# Patient Record
Sex: MALE | URBAN - METROPOLITAN AREA
[De-identification: names, ages, dates, MRNs, and addresses within clinical notes are randomized per-mention and may not be internally consistent; named-entity substitution may affect disease eponyms.]

---
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
## 2024-04-17 ENCOUNTER — HOSPITAL ENCOUNTER (INPATIENT)
Age: 19
LOS: 7 days | Discharge: HOME | DRG: 885 | End: 2024-04-24
Attending: EMERGENCY MEDICINE | Admitting: PSYCHIATRY & NEUROLOGY
Payer: MEDICAID

## 2024-04-17 DIAGNOSIS — R45.851 DEPRESSION WITH SUICIDAL IDEATION: Primary | ICD-10-CM

## 2024-04-17 DIAGNOSIS — F32.A DEPRESSION WITH SUICIDAL IDEATION: Primary | ICD-10-CM

## 2024-04-17 LAB
ALBUMIN SERPL-MCNC: 4.9 G/DL (ref 3.5–5.2)
ALP SERPL-CCNC: 62 U/L (ref 40–129)
ALT SERPL-CCNC: 15 U/L (ref 5–41)
AMPHET UR QL SCN: NEGATIVE
ANION GAP SERPL CALCULATED.3IONS-SCNC: 12 MMOL/L (ref 9–17)
APAP SERPL-MCNC: <5 UG/ML (ref 10–30)
AST SERPL-CCNC: 22 U/L
BARBITURATES UR QL SCN: NEGATIVE
BENZODIAZ UR QL: NEGATIVE
BILIRUB SERPL-MCNC: 0.5 MG/DL (ref 0.3–1.2)
BUN SERPL-MCNC: 11 MG/DL (ref 6–20)
CALCIUM SERPL-MCNC: 9.6 MG/DL (ref 8.6–10.4)
CANNABINOIDS UR QL SCN: NEGATIVE
CHLORIDE SERPL-SCNC: 98 MMOL/L (ref 98–107)
CO2 SERPL-SCNC: 28 MMOL/L (ref 20–31)
COCAINE UR QL SCN: NEGATIVE
CREAT SERPL-MCNC: 0.9 MG/DL (ref 0.7–1.2)
ERYTHROCYTE [DISTWIDTH] IN BLOOD BY AUTOMATED COUNT: 12.3 % (ref 11.5–14.9)
ETHANOL PERCENT: <0.01 %
ETHANOLAMINE SERPL-MCNC: <10 MG/DL
FENTANYL UR QL: NEGATIVE
GFR SERPL CREATININE-BSD FRML MDRD: >90 ML/MIN/1.73M2
GLUCOSE SERPL-MCNC: 97 MG/DL (ref 70–99)
HCT VFR BLD AUTO: 45 % (ref 41–53)
HGB BLD-MCNC: 15 G/DL (ref 13.5–17.5)
MCH RBC QN AUTO: 30.8 PG (ref 26–34)
MCHC RBC AUTO-ENTMCNC: 33.4 G/DL (ref 31–37)
MCV RBC AUTO: 92.1 FL (ref 80–100)
METHADONE UR QL: NEGATIVE
OPIATES UR QL SCN: NEGATIVE
OXYCODONE UR QL SCN: NEGATIVE
PCP UR QL SCN: NEGATIVE
PLATELET # BLD AUTO: 247 K/UL (ref 150–450)
PMV BLD AUTO: 8.4 FL (ref 6–12)
POTASSIUM SERPL-SCNC: 4.2 MMOL/L (ref 3.7–5.3)
PROT SERPL-MCNC: 7.7 G/DL (ref 6.4–8.3)
RBC # BLD AUTO: 4.88 M/UL (ref 4.5–5.9)
SALICYLATES SERPL-MCNC: <1 MG/DL (ref 3–10)
SODIUM SERPL-SCNC: 138 MMOL/L (ref 135–144)
TEST INFORMATION: NORMAL
TRICYCLIC ANTIDEP,URINE: NEGATIVE
TSH SERPL DL<=0.05 MIU/L-ACNC: 1.06 UIU/ML (ref 0.3–5)
WBC OTHER # BLD: 6.8 K/UL (ref 4.5–13.5)

## 2024-04-17 PROCEDURE — 84443 ASSAY THYROID STIM HORMONE: CPT

## 2024-04-17 PROCEDURE — 36415 COLL VENOUS BLD VENIPUNCTURE: CPT

## 2024-04-17 PROCEDURE — 80053 COMPREHEN METABOLIC PANEL: CPT

## 2024-04-17 PROCEDURE — 85027 COMPLETE CBC AUTOMATED: CPT

## 2024-04-17 PROCEDURE — 99285 EMERGENCY DEPT VISIT HI MDM: CPT

## 2024-04-17 PROCEDURE — 80179 DRUG ASSAY SALICYLATE: CPT

## 2024-04-17 PROCEDURE — 80143 DRUG ASSAY ACETAMINOPHEN: CPT

## 2024-04-17 PROCEDURE — G0480 DRUG TEST DEF 1-7 CLASSES: HCPCS

## 2024-04-17 PROCEDURE — 80307 DRUG TEST PRSMV CHEM ANLYZR: CPT

## 2024-04-17 PROCEDURE — 1240000000 HC EMOTIONAL WELLNESS R&B

## 2024-04-17 RX ORDER — MAGNESIUM HYDROXIDE/ALUMINUM HYDROXICE/SIMETHICONE 120; 1200; 1200 MG/30ML; MG/30ML; MG/30ML
30 SUSPENSION ORAL EVERY 6 HOURS PRN
Status: DISCONTINUED | OUTPATIENT
Start: 2024-04-17 | End: 2024-04-24 | Stop reason: HOSPADM

## 2024-04-17 RX ORDER — TRAZODONE HYDROCHLORIDE 50 MG/1
50 TABLET ORAL NIGHTLY PRN
Status: DISCONTINUED | OUTPATIENT
Start: 2024-04-18 | End: 2024-04-17

## 2024-04-17 RX ORDER — TRAZODONE HYDROCHLORIDE 50 MG/1
50 TABLET ORAL NIGHTLY PRN
Status: DISCONTINUED | OUTPATIENT
Start: 2024-04-17 | End: 2024-04-24 | Stop reason: HOSPADM

## 2024-04-17 RX ORDER — ACETAMINOPHEN 325 MG/1
650 TABLET ORAL EVERY 4 HOURS PRN
Status: DISCONTINUED | OUTPATIENT
Start: 2024-04-17 | End: 2024-04-24 | Stop reason: HOSPADM

## 2024-04-17 RX ORDER — IBUPROFEN 400 MG/1
400 TABLET ORAL EVERY 6 HOURS PRN
Status: DISCONTINUED | OUTPATIENT
Start: 2024-04-17 | End: 2024-04-24 | Stop reason: HOSPADM

## 2024-04-17 RX ORDER — POLYETHYLENE GLYCOL 3350 17 G/17G
17 POWDER, FOR SOLUTION ORAL DAILY PRN
Status: DISCONTINUED | OUTPATIENT
Start: 2024-04-17 | End: 2024-04-24 | Stop reason: HOSPADM

## 2024-04-17 RX ORDER — HYDROXYZINE 50 MG/1
50 TABLET, FILM COATED ORAL 3 TIMES DAILY PRN
Status: DISCONTINUED | OUTPATIENT
Start: 2024-04-17 | End: 2024-04-24 | Stop reason: HOSPADM

## 2024-04-17 RX ORDER — POLYETHYLENE GLYCOL 3350 17 G
2 POWDER IN PACKET (EA) ORAL
Status: DISCONTINUED | OUTPATIENT
Start: 2024-04-17 | End: 2024-04-17

## 2024-04-17 ASSESSMENT — PATIENT HEALTH QUESTIONNAIRE - PHQ9
SUM OF ALL RESPONSES TO PHQ QUESTIONS 1-9: 17
7. TROUBLE CONCENTRATING ON THINGS, SUCH AS READING THE NEWSPAPER OR WATCHING TELEVISION: SEVERAL DAYS
SUM OF ALL RESPONSES TO PHQ QUESTIONS 1-9: 19
8. MOVING OR SPEAKING SO SLOWLY THAT OTHER PEOPLE COULD HAVE NOTICED. OR THE OPPOSITE, BEING SO FIGETY OR RESTLESS THAT YOU HAVE BEEN MOVING AROUND A LOT MORE THAN USUAL: NOT AT ALL
SUM OF ALL RESPONSES TO PHQ9 QUESTIONS 1 & 2: 6
SUM OF ALL RESPONSES TO PHQ QUESTIONS 1-9: 19
5. POOR APPETITE OR OVEREATING: SEVERAL DAYS
6. FEELING BAD ABOUT YOURSELF - OR THAT YOU ARE A FAILURE OR HAVE LET YOURSELF OR YOUR FAMILY DOWN: NEARLY EVERY DAY
10. IF YOU CHECKED OFF ANY PROBLEMS, HOW DIFFICULT HAVE THESE PROBLEMS MADE IT FOR YOU TO DO YOUR WORK, TAKE CARE OF THINGS AT HOME, OR GET ALONG WITH OTHER PEOPLE: EXTREMELY DIFFICULT
SUM OF ALL RESPONSES TO PHQ QUESTIONS 1-9: 19
4. FEELING TIRED OR HAVING LITTLE ENERGY: NEARLY EVERY DAY
1. LITTLE INTEREST OR PLEASURE IN DOING THINGS: NEARLY EVERY DAY
3. TROUBLE FALLING OR STAYING ASLEEP: NEARLY EVERY DAY
9. THOUGHTS THAT YOU WOULD BE BETTER OFF DEAD, OR OF HURTING YOURSELF: MORE THAN HALF THE DAYS
2. FEELING DOWN, DEPRESSED OR HOPELESS: NEARLY EVERY DAY

## 2024-04-17 ASSESSMENT — LIFESTYLE VARIABLES
HOW MANY STANDARD DRINKS CONTAINING ALCOHOL DO YOU HAVE ON A TYPICAL DAY: PATIENT DOES NOT DRINK
HOW OFTEN DO YOU HAVE A DRINK CONTAINING ALCOHOL: NEVER
HOW OFTEN DO YOU HAVE A DRINK CONTAINING ALCOHOL: NEVER
HOW MANY STANDARD DRINKS CONTAINING ALCOHOL DO YOU HAVE ON A TYPICAL DAY: PATIENT DOES NOT DRINK

## 2024-04-17 ASSESSMENT — SLEEP AND FATIGUE QUESTIONNAIRES
SLEEP PATTERN: INSOMNIA;RESTLESSNESS
DO YOU USE A SLEEP AID: NO
AVERAGE NUMBER OF SLEEP HOURS: 4
DO YOU HAVE DIFFICULTY SLEEPING: YES

## 2024-04-18 PROBLEM — F33.2 MAJOR DEPRESSIVE DISORDER, RECURRENT, SEVERE WITHOUT PSYCHOTIC FEATURES (HCC): Status: ACTIVE | Noted: 2024-04-18

## 2024-04-18 PROBLEM — F12.90 CANNABIS USE DISORDER: Status: ACTIVE | Noted: 2024-04-18

## 2024-04-18 PROCEDURE — 1240000000 HC EMOTIONAL WELLNESS R&B

## 2024-04-18 PROCEDURE — 99232 SBSQ HOSP IP/OBS MODERATE 35: CPT | Performed by: PSYCHIATRY & NEUROLOGY

## 2024-04-18 PROCEDURE — 99222 1ST HOSP IP/OBS MODERATE 55: CPT | Performed by: INTERNAL MEDICINE

## 2024-04-18 PROCEDURE — APPSS60 APP SPLIT SHARED TIME 46-60 MINUTES: Performed by: NURSE PRACTITIONER

## 2024-04-18 PROCEDURE — 6370000000 HC RX 637 (ALT 250 FOR IP): Performed by: PSYCHIATRY & NEUROLOGY

## 2024-04-18 RX ORDER — FLUOXETINE 10 MG/1
10 CAPSULE ORAL DAILY
Status: DISCONTINUED | OUTPATIENT
Start: 2024-04-18 | End: 2024-04-19

## 2024-04-18 RX ADMIN — FLUOXETINE 10 MG: 10 CAPSULE ORAL at 14:09

## 2024-04-18 ASSESSMENT — LIFESTYLE VARIABLES
HOW MANY STANDARD DRINKS CONTAINING ALCOHOL DO YOU HAVE ON A TYPICAL DAY: PATIENT DOES NOT DRINK
HOW OFTEN DO YOU HAVE A DRINK CONTAINING ALCOHOL: NEVER

## 2024-04-18 NOTE — H&P
IN-PATIENT SERVICE  [facility]    CONSULTATION / HISTORY AND PHYSICAL EXAMINATION            Date:   4/18/2024  Patient name:  Wendy Monroy  Date of admission:  4/17/2024  8:24 PM  MRN:   [id number]  Account:  [id number]  YOB: 2005  PCP:    No primary care provider on file.  Room:   [address] [place] [address]  Code Status:    Full Code    Physician Requesting Consult: Mayank Henriquez MD    Reason for Consult:  medical management    Chief Complaint:     Chief Complaint   Patient presents with    Suicidal       History Obtained From:     Patient medical record nursing staff    History of Present Illness:   Patient, has past medical history major depression, admitted to [facility] unit with worsening depression, suicidal ideation  Patient is a very healthy young person never diagnosed with chronic medical condition like diabetes, hypertension, coronary artery disease  No complaints of chest pain, shortness of breath    Past Medical History:     History reviewed. No pertinent past medical history.     Past Surgical History:     History reviewed. No pertinent surgical history.     Medications Prior to Admission:     Prior to Admission medications    Not on File        Allergies:     Patient has no known allergies.    Social History:     Tobacco:    reports that he has never smoked. He has never been exposed to tobacco smoke. He has never used smokeless tobacco.  Alcohol:      has no history on file for alcohol use.  Drug Use:  reports no history of drug use.    Family History:     History reviewed. No pertinent family history.    Review of Systems:     Positive and Negative as described in HPI.    CONSTITUTIONAL:  negative for fevers, chills, sweats, fatigue, weight loss  HEENT:  negative for vision, hearing changes, runny nose, throat pain  RESPIRATORY:  negative for shortness of breath, cough, congestion, wheezing.  CARDIOVASCULAR:  negative for chest pain, 
prior suicide attempts and recently patient ingested an excessive amount of Tylenol, which caused him to vomit excessively.  He did not seek medical care at that time.  Patient also showed writer his left forearm which has a long red scratch on it.  Patient identified increasing financial stress due to quitting his jobs last month.  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  This led to an emotional breakdown.  Patient decided to walk away from his employment but now is facing eviction from his apartment because he has been unable to pay his rent.  Patient reports that his impulsive decision to quit his jobs is related to ongoing symptoms of depression.  Patient states that this has been increasing for the past few months.  He endorses anhedonia, avolition, feelings of hopelessness, helplessness, and worthlessness, isolation, wanting to sleep too much, decreased appetite, decreased energy and poor concentration, and increasing suicidal ideation.  He also recounted attempting to hang himself as a freshman in high school.  Patient has never been admitted to a psychiatric facility before.  He is not linked with [facility] mental health.  Patient does not socialize much outside of playing video games online with friends who are located in other areas.  He is not particularly close with family.  Patient is open to trialing antidepressant medication and would like to start therapy after discharge.    Patient endorses a history of anxiety that he relates to autism spectrum disorder.  He will frequently feel anxious, restless, nervous, fatigued, and will worry excessively at times.  Patient denies a history of psychosis or paranoia.  He denies a history of OCD or phobias.  Patient denies a history of PTSD and cannot identify any traumatic events in childhood or adolescence.  Patient did not identify with any cluster B traits.    Patient denies a

## 2024-04-18 NOTE — GROUP NOTE
Group Therapy Note    Date: 4/18/2024    Group Start Time: 1330  Group End Time: 1410  Group Topic: Recreational    [facility] Holly Bearden CTRS    Recreation Group Note        Date: April 18, 2024 Start Time: 1:30pm  End Time:  210pm      Number of Participants in Group & Unit Census:  7/14    Topic: relaxation group     Goal of Group: pt will demonstrate interpersonal relationships and improved leisure awareness       Comments:     Patient did not participate in recreation group, despite staff encouragement and explanation of benefits.  Patient remain seclusive to self.  Q15 minute safety checks maintained for patient safety and will continue to encourage patient to attend unit programming.              Signature:  KELLY GRAY

## 2024-04-18 NOTE — CARE COORDINATION
BHI Biopsychosocial Assessment    Current Level of Psychosocial Functioning     Independent X  Dependent    Minimal Assist     Comments:    Psychosocial High Risk Factors (check all that apply)    Unable to obtain meds   Chronic illness/pain    Substance abuse   Lack of Family Support   Financial stress   Isolation   Inadequate Community Resources  Suicide attempt(s) X   Not taking medications   Victim of crime   Developmental Delay  Unable to manage personal needs    Age 65 or older   Homeless  No transportation   Readmission within 30 days  Unemployment  Traumatic Event    Comments:   Psychiatric Advanced Directives: None reported     Family to Involve in Treatment:  None    Sexual Orientation: david     Patient Strengths: Adequate housing, pending medicaid, and support     Patient Barriers:  lack of income, suicide attempt/suicidal ideations, and poor coping skills      Opiate Education Provided:  SATINDER      CMHC/mental health history: Patient reports not currently linked but requesting to be linked with [facility]     Plan of Care   medication management, group/individual therapies, family meetings, psycho -education, treatment team meetings to assist with stabilization    Initial Discharge Plan:  Patient would like to return home and follow up with [facility]        Clinical Summary:  18 year old  male presents to the [facility] after a suicide attempt to OD on Tylenol pills. This is patient's first psychiatric admission. When asked by  what brought patient in, patient responded, \"I tried to take my own life.\" Patient reports to  that his depression was getting worse and he was \"working too much.\" Patient endorses previous suicide attempts but unable to provide how many attempts. Patient denies suicidal ideations currently. Patient does endorse some hopelessness, rating it at a 3, on a scale 1-10, with 10 being the highest. Patient denies homicidal ideations. Patient also denies hallucinations of

## 2024-04-18 NOTE — BH NOTE
Provider notified of suicide BPA and pt's current mental status. Provider declined 1:1 and ordered 15 minute monitoring.

## 2024-04-18 NOTE — BH NOTE
[facility]  Admission Note     Admission Type:   Involuntary - Signed in Upon Admission    Reason for admission:  Reason for Admission: Involuntary patient reporting depression and two suicide attempts by overdosing on Tylenol over the last 10 days. He states that he hasn't been happy for a long time and has not taken mental health medications in a long time.      Addictive Behavior:   Addictive Behavior  In the Past 3 Months, Have You Felt or Has Someone Told You That You Have a Problem With  : None    Medical Problems:   History reviewed. No pertinent past medical history.    Status EXAM:  Mental Status and Behavioral Exam  Normal: No  Level of Assistance: Independent/Self  Facial Expression: Avoids Gaze, Flat, Sad  Affect: Blunt  Level of Consciousness: Alert  Frequency of Checks: 4 times per hour, close  Mood:Normal: No  Mood: Depressed, Anxious, Helpless  Motor Activity:Normal: Yes  Eye Contact: Fair  Observed Behavior: Preoccupied  Sexual Misconduct History: Current - no  Preception: [place] to person, [place] to time, [place] to place, [place] to situation  Attention:Normal: No  Attention: Distractible  Thought Processes: Circumstantial  Thought Content:Normal: No  Thought Content: Preoccupations  Depression Symptoms: Change in energy level, Loss of interest, Isolative, Impaired concentration, Feelings of helplessness, Feelings of hopelessess, Feelings of worthlessness, Sleep disturbance  Anxiety Symptoms: Generalized  Kristine Symptoms: No problems reported or observed.  Hallucinations: None  Delusions: No  Memory:Normal: No  Memory: Poor recent  Insight and Judgment: No  Insight and Judgment: Poor judgment, Poor insight    Tobacco Screening:  Practical Counseling, on admission, chikis X, if applicable and completed (first 3 are required if patient doesn't refuse):            ( ) Recognizing danger situations (included triggers and roadblocks)                    ( ) Coping skills (new ways to manage

## 2024-04-18 NOTE — ED PROVIDER NOTES
eMERGENCY dEPARTMENT eNCOUnter      Pt Name: Wendy Monroy  MRN: [id number]  Birthdate 2005  Date of evaluation: 4/17/24      CHIEF COMPLAINT       Chief Complaint   Patient presents with    Suicidal         HISTORY OF PRESENT ILLNESS    Wendy Monroy is a 18 y.o. male who presents complaining of suicidal ideation.  Patient was brought in by police after being evaluated by [facility] and put on a pink slip.  Patient has been having suicidal ideation he says for a long time.  In the last week or 2 he has tried overdosing a couple times on Tylenol.  Patient states that he has not done anything today.  Patient has attempted in the past.  Patient states he has not been on any medications since he was a kid.  Patient denies any hallucinations.  Patient denies somatic issues.  Patient denies drug or alcohol use.      REVIEW OF SYSTEMS       Review of Systems   Constitutional:  Negative for activity change, appetite change, chills, diaphoresis and fever.   HENT:  Negative for congestion, ear pain, facial swelling, nosebleeds, rhinorrhea, sinus pressure, sore throat and trouble swallowing.    Eyes:  Negative for pain, discharge and redness.   Respiratory:  Negative for cough, chest tightness, shortness of breath and wheezing.    Cardiovascular:  Negative for chest pain, palpitations and leg swelling.   Gastrointestinal:  Negative for abdominal pain, blood in stool, constipation, diarrhea, nausea and vomiting.   Genitourinary:  Negative for difficulty urinating, dysuria, flank pain, frequency, genital sores and hematuria.   Musculoskeletal:  Negative for arthralgias, back pain, gait problem, joint swelling, myalgias and neck pain.   Skin:  Negative for color change, pallor, rash and wound.   Neurological:  Negative for dizziness, tremors, seizures, syncope, speech difficulty, weakness, numbness and headaches.   Psychiatric/Behavioral:  Positive for dysphoric mood and suicidal ideas. Negative for confusion, decreased

## 2024-04-18 NOTE — ED NOTES
safety of client.\"    Pt is suicidal. Pt admits to attempting suicide twice in the past month by trying to intentionally overdose on tylenol. Pt \"no longer wants to live.\" Pt states there are multiple triggers to pt's SI but the main is financial reasons. Pt denies HI/AH/VH. Pt denies previous mental health treatment. Pt is not prescribed medications for pt's mental health. Pt denies substance abuse. Pt has had a decrease in pt's appetite. Pt reports a lack of motivation and not wanting to get out of bed most days.     Pt is calm, cooperative, and voluntary for treatment.     Level of Care Disposition:.CASSIE consulted with  from psychiatry. Pt accepted for an inpatient admission to the [facility] for safety and stabilization.

## 2024-04-18 NOTE — GROUP NOTE
Group Therapy Note    Date: 4/18/2024    Group Start Time: 1005  Group End Time: 1037  Group Topic: Psychoeducation    [facility]    Robyn Ellsi MSW        Group Therapy Note    Attendees: 7/15     Patient was offered group therapy today but declined to participate despite encouragement from staff.  1:1 was offered.       Signature:  KAILYN Astudillo

## 2024-04-18 NOTE — GROUP NOTE
Group Therapy Note    Date: 4/18/2024    Group Start Time: 1050  Group End Time: 1130  Group Topic: Cognitive Skills    [facility]    Holly Demarco CTRS        Group Therapy Note    Attendees 7/15       Patient's Goal:  pt will demonstrate improved coping skills and improved decision making skills     Notes:   pt was pleasant and participated well     Status After Intervention:  Improved    Participation Level: Active Listener    Participation Quality: appropriate       Speech:  normal      Thought Process/Content: logical       Affective Functioning: flat      Mood: depressed      Level of consciousness:  Alert      Response to Learning: Able to verbalize current knowledge/experience, Capable of insight, and Progressing to goal      Endings: None Reported    Modes of Intervention: Support, Socialization, and Activity      Discipline Responsible: Psychoeducational Specialist      Signature:  KELLY GRAY

## 2024-04-19 PROCEDURE — 99232 SBSQ HOSP IP/OBS MODERATE 35: CPT | Performed by: PSYCHIATRY & NEUROLOGY

## 2024-04-19 PROCEDURE — 1240000000 HC EMOTIONAL WELLNESS R&B

## 2024-04-19 PROCEDURE — APPSS30 APP SPLIT SHARED TIME 16-30 MINUTES

## 2024-04-19 PROCEDURE — 90833 PSYTX W PT W E/M 30 MIN: CPT | Performed by: PSYCHIATRY & NEUROLOGY

## 2024-04-19 PROCEDURE — 6370000000 HC RX 637 (ALT 250 FOR IP): Performed by: PSYCHIATRY & NEUROLOGY

## 2024-04-19 RX ORDER — FLUOXETINE HYDROCHLORIDE 20 MG/1
20 CAPSULE ORAL DAILY
Status: DISCONTINUED | OUTPATIENT
Start: 2024-04-20 | End: 2024-04-24 | Stop reason: HOSPADM

## 2024-04-19 RX ADMIN — FLUOXETINE 10 MG: 10 CAPSULE ORAL at 08:24

## 2024-04-19 NOTE — GROUP NOTE
Group Therapy Note    Date: 4/19/2024    Group Start Time: 0900  Group End Time: 0930  Group Topic: Community Meeting    Keyona Almazan        Group Therapy Note    Attendees: 6/15     Community Meeting Group Note        Date: April 19, 2024 Start Time: 9am  End Time:  0930      Number of Participants in Group & Unit Census:  6/15    Topic: goal setting    Goal of Group: Set short term goal for the day      Comments:     Patient did not participate in Community Meeting group, despite staff encouragement and explanation of benefits.  Patient remain seclusive to self.  Q15 minute safety checks maintained for patient safety and will continue to encourage patient to attend unit programming.

## 2024-04-19 NOTE — GROUP NOTE
Group Therapy Note    Date: 4/19/2024    Group Start Time: 1000  Group End Time: 1030  Group Topic: Psychotherapy    Shreyas Valdez        Group Therapy Note    Attendees: 5/15     Patient declined to attend psychotherapy group after encouragement from staff.  1:1 talk time offered but refused.     Signature:  Shreyas Howard

## 2024-04-19 NOTE — GROUP NOTE
Group Therapy Note    Date: 4/19/2024    Group Start Time: 1430  Group End Time: 1510  Group Topic: Healthy Living/Wellness    Keyona Almazan        Group Therapy Note    Attendees: 4/13       Patient's Goal:  List positive coping skills    Notes:      Status After Intervention:  Unchanged    Participation Level: Active Listener    Participation Quality: Appropriate      Speech:  normal      Thought Process/Content: Logical      Affective Functioning: Congruent      Mood: anxious      Level of consciousness:  Oriented x4      Response to Learning: Able to verbalize current knowledge/experience and Progressing to goal      Endings: None Reported    Modes of Intervention: Education, Support, and Socialization      Discipline Responsible: Behavorial Health Tech      Signature:  Keyona Mata     Shower only

## 2024-04-19 NOTE — GROUP NOTE
Psych-Ed/Relapse Prevention Group Note        Date: April 19, 2024 Start Time: 11am  End Time:  11:40am      Number of Participants in Group & Unit Census:  5/15    Topic: Socialization    Goal of Group:Patient will demonstrate improved interpersonal skills      Comments:     Patient did not participate in Psych-Ed/Relapse Prevention group, despite staff encouragement and explanation of benefits.  Patient remain seclusive to self.  Q15 minute safety checks maintained for patient safety and will continue to encourage patient to attend unit programming.         Signature:  RAN HeadleyS

## 2024-04-19 NOTE — GROUP NOTE
Group Therapy Note    Date: 4/19/2024    Group Start Time: 1330  Group End Time: 1420  Group Topic: Music Therapy    Choco Hoyos    Music Therapy Group Note        Date: 4/19/2024 Start Time: 1330  End Time: 1420      Number of Participants in Group & Unit Census:  3/13    Topic: Offered patients a variety of topics to discuss during music therapy group and no patients expressed preference. Patients shared music and had an opportunity to share about what they felt was important about their music, and then this writer would ask at least one question based on their music or sharing about their music.     Goal of Group: Goals to increase self-expression; Increase socialization; Demonstrate positive use of time; Increase sense of community; Increase rapport with staff.       Comments:     Patient did not participate in Music Therapy group, despite staff encouragement and explanation of benefits.  Patient remain seclusive to self.  Q15 minute safety checks maintained for patient safety and will continue to encourage patient to attend unit programming.

## 2024-04-19 NOTE — BH NOTE
Verbal consent given to speak to mom Nicole. Verbal consent given to GIULIA Brown. EMELY updated in chart.

## 2024-04-20 PROCEDURE — 99232 SBSQ HOSP IP/OBS MODERATE 35: CPT

## 2024-04-20 PROCEDURE — 1240000000 HC EMOTIONAL WELLNESS R&B

## 2024-04-20 PROCEDURE — 6370000000 HC RX 637 (ALT 250 FOR IP): Performed by: PSYCHIATRY & NEUROLOGY

## 2024-04-20 PROCEDURE — 99231 SBSQ HOSP IP/OBS SF/LOW 25: CPT | Performed by: INTERNAL MEDICINE

## 2024-04-20 RX ADMIN — HYDROXYZINE HYDROCHLORIDE 50 MG: 50 TABLET, FILM COATED ORAL at 23:49

## 2024-04-20 RX ADMIN — FLUOXETINE 20 MG: 20 CAPSULE ORAL at 08:33

## 2024-04-20 RX ADMIN — TRAZODONE HYDROCHLORIDE 50 MG: 50 TABLET ORAL at 23:48

## 2024-04-20 NOTE — GROUP NOTE
Group Therapy Note    Date: 4/20/2024    Group Start Time: 0900  Group End Time: 0926  Group Topic: Community Meeting    Lashaun Merlos LPN        Group Therapy Note    Attendees: 2/11    patient refused to attend Goals group at 0900 after encouragement from staff.  1:1 talk time provided as alternative to group session      Signature:  Lashaun Samuels LPN

## 2024-04-20 NOTE — GROUP NOTE
Group Therapy Note    Date: 4/20/2024    Group Start Time: 1003  Group End Time: 1037  Group Topic: Psychoeducation    [facility]    Robyn Ellis MSW        Group Therapy Note    Attendees: 1/10     Patient was offered group therapy today but declined to participate despite encouragement from staff.  1:1 was offered.       Signature:  KAILYN Astudillo

## 2024-04-20 NOTE — GROUP NOTE
Group Therapy Note    Date: 4/20/2024    Group Start Time: 1400  Group End Time: 1440  Group Topic: Cognitive Skills    Ifrah Hercules CTRS        Group Therapy Note    Attendees: 6/10     Patient's Goal: To increase social interaction, practice decision making and impulse control.         Notes:  Pt was pleasant and cooperative, and participated fully in group task. Pt was   able to practice decision making and impulse control independently, with some assistance   in learning scoring for task.       Status After Intervention:  Improved     Participation Level: Active Listener and Interactive r/t  task and topic, somewhat seclusive to self   but improved during group     Participation Quality: Appropriate, Attentive, engaged r/t task ,pt made more eye contact with RT   and select peers during task than earlier today.     Speech:  Minimal, softly spoken but able to communicate his own choices in task      Thought Process/Content: Logical, linear r/t task .       Affective Functioning: Blunted, brightened, improved eye contact at intervals      Mood: Cooperative, somewhat seclusive to self but improved eye contact ,and brightened with   humor in group. Pt increased independent decision making in task.      Level of consciousness:  Alert, and Attentive      Response to Learning: Able to verbalize current knowledge/experience, Able to   verbalize/acknowledge new learning, and Progressing to goal      Endings: None Reported      Modes of Intervention: Education, Support, Socialization, and Problem-solving    Discipline Responsible: Psychoeducational Specialist      Signature:  KELLY STEELE

## 2024-04-21 PROCEDURE — 6370000000 HC RX 637 (ALT 250 FOR IP): Performed by: PSYCHIATRY & NEUROLOGY

## 2024-04-21 PROCEDURE — 1240000000 HC EMOTIONAL WELLNESS R&B

## 2024-04-21 PROCEDURE — 99232 SBSQ HOSP IP/OBS MODERATE 35: CPT

## 2024-04-21 PROCEDURE — 99231 SBSQ HOSP IP/OBS SF/LOW 25: CPT | Performed by: INTERNAL MEDICINE

## 2024-04-21 RX ADMIN — TRAZODONE HYDROCHLORIDE 50 MG: 50 TABLET ORAL at 20:39

## 2024-04-21 RX ADMIN — FLUOXETINE 20 MG: 20 CAPSULE ORAL at 08:42

## 2024-04-21 RX ADMIN — ACETAMINOPHEN 650 MG: 325 TABLET ORAL at 20:39

## 2024-04-21 RX ADMIN — HYDROXYZINE HYDROCHLORIDE 50 MG: 50 TABLET, FILM COATED ORAL at 20:39

## 2024-04-21 ASSESSMENT — PAIN SCALES - GENERAL: PAINLEVEL_OUTOF10: 5

## 2024-04-21 NOTE — GROUP NOTE
Group Therapy Note    Date: 4/21/2024    Group Start Time: 1400  Group End Time: 1445  Group Topic: Cognitive Skills    Ifrah Hercules CTRS        Group Therapy Note    Attendees: 5/9       Topic: To increase social interaction, practice decision making skills, and deductive reasoning.      Patient did not participate in Cognitive Skills Group at 1400, despite staff encouragement   and explanation of benefits.      Patient was seclusive to self and room during group.     Q15 minute safety checks maintained for patient safety and will continue to encourage   patient to attend unit programming.       Discipline Responsible: Psychoeducational Specialist  Signature:  KELLY STEELE

## 2024-04-21 NOTE — GROUP NOTE
Group Therapy Note    Date: 4/21/2024    Group Start Time: 0900  Group End Time: 0930  Group Topic: Nursing    Alona Hernández LPN        Group Therapy Note    Attendees: 1/9    patient refused to attend goals group at 0900 after encouragement from staff.  1:1 talk time provided as alternative to group session        Signature:  Alona Vega LPN

## 2024-04-21 NOTE — GROUP NOTE
Group Therapy Note    Date: 4/21/2024    Group Start Time: 1005  Group End Time: 1038  Group Topic: Psychoeducation    [facility]    Robyn Ellis MSW        Group Therapy Note    Attendees: 3/9       Patient was offered group therapy today but declined to participate despite encouragement from staff.  1:1 was offered.       Signature:  KAILYN Astudillo

## 2024-04-22 PROCEDURE — APPSS30 APP SPLIT SHARED TIME 16-30 MINUTES

## 2024-04-22 PROCEDURE — 1240000000 HC EMOTIONAL WELLNESS R&B

## 2024-04-22 PROCEDURE — 6370000000 HC RX 637 (ALT 250 FOR IP): Performed by: PSYCHIATRY & NEUROLOGY

## 2024-04-22 PROCEDURE — 99232 SBSQ HOSP IP/OBS MODERATE 35: CPT | Performed by: PSYCHIATRY & NEUROLOGY

## 2024-04-22 RX ADMIN — FLUOXETINE 20 MG: 20 CAPSULE ORAL at 08:27

## 2024-04-22 ASSESSMENT — PAIN SCALES - GENERAL: PAINLEVEL_OUTOF10: 0

## 2024-04-22 NOTE — GROUP NOTE
Psych-Ed/Relapse Prevention Group Note        Date: April 22, 2024 Start Time: 11am  End Time: 11:45am      Number of Participants in Group & Unit Census:  3/11    Topic: Coping skills and Peer support    Goal of Group:Patient will identify healthy coping skills and ways to schedule leisure for coping      Comments:     Patient did not participate in Psych-Ed/Relapse Prevention group, despite staff encouragement and explanation of benefits.  Patient remain seclusive to self.  Q15 minute safety checks maintained for patient safety and will continue to encourage patient to attend unit programming.         Signature:  RAN HeadleyS

## 2024-04-22 NOTE — GROUP NOTE
Group Therapy Note    Date: 4/22/2024    Group Start Time: 1330  Group End Time: 1415  Group Topic: Cognitive Skills    [facility] Ludmila Shannon CTRS        Group Therapy Note    Attendees: 4/10    Cognitive Skills Group Note        Date: April 22, 2024            Start Time: 1:30pm  End Time: 2:15pm      Number of Participants in Group & Unit Census:  4/10    Topic:  interpersonal skills, decision-making, self-expression     Goal of Group: To improve interpersonal skills and decision-making through collaborating with peers and demonstrating self-expression.       Comments:     Patient did not participate in Cognitive Skills group, despite staff encouragement and explanation of benefits.  Patient remain seclusive to self.  Q15 minute safety checks maintained for patient safety and will continue to encourage patient to attend unit programming.        Signature:  KELLY Cruz

## 2024-04-22 NOTE — GROUP NOTE
Group Therapy Note    Date: 4/22/2024    Group Start Time: 1000  Group End Time: 1030  Group Topic: Psychoeducation    Shreyas Valdez        Group Therapy Note    Attendees: 2/12     Patient declined to attend psychotherapy group after encouragement from staff.  1:1 talk time offered but refused.     Signature:  Shreyas Howard

## 2024-04-23 PROCEDURE — 6370000000 HC RX 637 (ALT 250 FOR IP): Performed by: PSYCHIATRY & NEUROLOGY

## 2024-04-23 PROCEDURE — 1240000000 HC EMOTIONAL WELLNESS R&B

## 2024-04-23 PROCEDURE — APPSS30 APP SPLIT SHARED TIME 16-30 MINUTES

## 2024-04-23 PROCEDURE — 99232 SBSQ HOSP IP/OBS MODERATE 35: CPT | Performed by: PSYCHIATRY & NEUROLOGY

## 2024-04-23 RX ADMIN — FLUOXETINE 20 MG: 20 CAPSULE ORAL at 08:25

## 2024-04-23 NOTE — GROUP NOTE
Group Therapy Note    Date: 4/23/2024    Group Start Time: 1330  Group End Time: 1415  Group Topic: Cognitive Skills    [facility] Ifrah Deras CTRS        Group Therapy Note    Attendees: 6/12     Patient's Goal: To increase social interaction, practice collaboration with peers, problem solving, and   crisis management skills.       Notes:  Pt was pleasant and cooperative, and participated fully in group task. Pt was   able to practice collaboration with peers, problem solving, and crisis management skills.  as part of a team.       Status After Intervention:  Improved     Participation Level: Active Listener and Interactive r/t  task and topic, supportive     Participation Quality: Appropriate, Attentive, engaged r/t task ,supportive     Speech:  Normal      Thought Process/Content: Logical, linear r/t task ,some thought blocking.       Affective Functioning: Congruent, brightened      Mood: Cooperative, euthymic . Pt does demonstrate improved confidence in terms of expressing his   opinions x3 ,and at times politely disagreeing with points made r/t task by overbearing peer.      Level of consciousness:  Alert, and Attentive      Response to Learning: Able to verbalize current knowledge/experience, Able to   verbalize/acknowledge new learning, and Progressing to goal      Endings: None Reported      Modes of Intervention: Education, Support, Socialization, and Problem-solving    Discipline Responsible: Psychoeducational Specialist      Signature:  KELLY STEELE

## 2024-04-23 NOTE — GROUP NOTE
Group Therapy Note    Date: 4/23/2024    Group Start Time: 1100  Group End Time: 1145  Group Topic: Cognitive Skills    [facility] Ifrah Deras CTRS        Group Therapy Note    Attendees: 8/12     Patient's Goal: To increase social interaction, practice decision making skills, and explore perception.       Notes:  Pt was pleasant and cooperative, and participated fully in group task. Pt was   able to practice decision making, and explore perception r/t situations/thoughts/feelings independently.       Status After Intervention:  Improved     Participation Level: Active Listener and Interactive r/t  task and topic, supportive     Participation Quality: Appropriate, Attentive, engaged r/t task ,supportive     Speech:  Normal      Thought Process/Content: Logical, linear r/t task .       Affective Functioning: Congruent, brightened      Mood: Cooperative, euthymic       Level of consciousness:  Alert, and Attentive      Response to Learning: Able to verbalize current knowledge/experience, Able to   verbalize/acknowledge new learning, and Progressing to goal      Endings: None Reported      Modes of Intervention: Education, Support, Socialization, and Problem-solving    Discipline Responsible: Psychoeducational Specialist      Signature:  KELLY STEELE

## 2024-04-23 NOTE — GROUP NOTE
Group Therapy Note    Date: 4/23/2024    Group Start Time: 1430  Group End Time: 1515  Group Topic: Recovery    Ifrah Hercules, KELLY        Group Therapy Note    Attendees: 7/12     Pt attended Recovery Group at 1430 facilitated by [employer].

## 2024-04-23 NOTE — GROUP NOTE
Group Therapy Note    Date: 4/23/2024    Group Start Time: 1000  Group End Time: 1030  Group Topic: Psychotherapy    Shreyas Valdez        Group Therapy Note    Attendees: 6/12       Patient's Goal:  PT will demonstrate increased interpersonal interaction and participate in group activities of discussing journaling.     Notes:  Patient was an active listener during group discussion on this date.     Status After Intervention:  Improved    Participation Level: Active Listener and Interactive    Participation Quality: Appropriate and Attentive      Speech:  normal      Thought Process/Content: Logical      Affective Functioning: Flat      Mood: euthymic      Level of consciousness:  Alert, Oriented x4, and Attentive      Response to Learning: Able to verbalize/acknowledge new learning and Progressing to goal      Endings: None Reported    Modes of Intervention: Support, Socialization, and Exploration      Discipline Responsible: /Counselor      Signature:  Shreyas Howard

## 2024-04-24 VITALS
OXYGEN SATURATION: 99 % | TEMPERATURE: 98.2 F | RESPIRATION RATE: 14 BRPM | WEIGHT: 119 LBS | HEIGHT: 67 IN | DIASTOLIC BLOOD PRESSURE: 60 MMHG | BODY MASS INDEX: 18.68 KG/M2 | SYSTOLIC BLOOD PRESSURE: 114 MMHG | HEART RATE: 55 BPM

## 2024-04-24 PROCEDURE — 99239 HOSP IP/OBS DSCHRG MGMT >30: CPT | Performed by: PSYCHIATRY & NEUROLOGY

## 2024-04-24 PROCEDURE — 6370000000 HC RX 637 (ALT 250 FOR IP): Performed by: PSYCHIATRY & NEUROLOGY

## 2024-04-24 RX ORDER — FLUOXETINE HYDROCHLORIDE 20 MG/1
20 CAPSULE ORAL DAILY
Qty: 30 CAPSULE | Refills: 3 | Status: SHIPPED | OUTPATIENT
Start: 2024-04-25

## 2024-04-24 RX ADMIN — FLUOXETINE 20 MG: 20 CAPSULE ORAL at 08:25

## 2024-04-24 NOTE — GROUP NOTE
Group Therapy Note    Date: 4/24/2024    Group Start Time: 1003  Group End Time: 1045  Group Topic: Psychoeducation    [facility] Robyn Dowling MSW        Group Therapy Note    Attendees: 11/21       Patient's Goal:  PT will demonstrate increased interpersonal interactions and gain insight into self    Notes:  Group discussion utilizing conversation cards to allow patients the ability to gain insight into themselves.    Status After Intervention:  Improved    Participation Level: Active Listener and Interactive    Participation Quality: Appropriate, Attentive, and Sharing      Speech:  normal      Thought Process/Content: Logical      Affective Functioning: Congruent      Mood: elevated      Level of consciousness:  Alert      Response to Learning: Able to verbalize current knowledge/experience, Able to verbalize/acknowledge new learning, and Able to retain information      Endings: None Reported    Modes of Intervention: Education, Support, and Socialization      Discipline Responsible: /Counselor      Signature:  KAILYN Astudillo

## 2024-04-24 NOTE — DISCHARGE SUMMARY
DISCHARGE SUMMARY      Patient ID:  Wendy Monroy  [id number]  18 y.o.  2005    Admit date: 4/17/2024    Discharge date and time: 4/24/2024    Disposition: Home     Admitting Physician: Mayank Henriquez MD     Discharge Physician: Dr RUCHI Henriquez MD    Admission Diagnoses: Depression with suicidal ideation [F32.A, R45.851]    Admission Condition: poor    Discharged Condition: stable    Admission Circumstance: Wendy Monroy is a 18 y.o. male who has a past medical history of depression and cannabis use. Patient presented to the ED by police after presenting to [facility] and reporting suicidal ideation and 2 recent suicide attempts over the past 2 weeks by ingesting an excessive amount of Tylenol.  Patient endorsed financial stress and occupational stress and increasing symptoms of depression.  This is patient's first admission to [facility].     Patient was seen for initial evaluation today.  He has been cooperative and participating in recreational groups.  Patient was observed to be playing a game with peers and was agreeable to conversation in privacy of unit sensory room.  Patient is endorsing a history of depression beginning in early adolescence.  He also states that he has autism spectrum disorder and over the years this has caused issues with his mental health.  Patient reported taking medication in the past for ADHD and depression but he cannot recall what he took.  He denies that he has any current prescription medications.  Patient endorses chronic suicidal ideation for \"years\".  He admitted to prior suicide attempts and recently patient ingested an excessive amount of Tylenol, which caused him to vomit excessively.  He did not seek medical care at that time.  Patient also showed writer his left forearm which has a long red scratch on it.  Patient identified increasing financial stress due to quitting his jobs last month.  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

## 2024-04-24 NOTE — BH NOTE
[facility] [place]  Discharge Note    Pt discharged with followings belongings:   Dental Appliances: None  Vision - Corrective Lenses: None  Hearing Aid: None  Jewelry: None  Body Piercings Removed: N/A  Clothing: Footwear, Jacket/Coat  Other Valuables: Other (Comment) (none)   Valuables sent home with patient or returned to patient. Patient educated on aftercare instructions: yes  Information faxed to [facility] by staff  at 4:15 PM .Patient verbalize understanding of AVS:  yes.    Status EXAM upon discharge:  Mental Status and Behavioral Exam  Normal: Yes  Level of Assistance: Independent/Self  Facial Expression: Brightened  Affect: Appropriate  Level of Consciousness: Alert  Frequency of Checks: 4 times per hour, close  Mood:Normal: No  Mood: Depressed, Anxious  Motor Activity:Normal: Yes  Motor Activity: Decreased  Eye Contact: Good  Observed Behavior: Friendly, Cooperative, Preoccupied  Sexual Misconduct History: Current - no  Preception: [place] to person, [place] to time, [place] to place, [place] to situation  Attention:Normal: No  Attention: Distractible  Thought Processes: Circumstantial  Thought Content:Normal: No  Thought Content: Preoccupations  Depression Symptoms: Impaired concentration, Isolative  Anxiety Symptoms: Generalized  Kristine Symptoms: No problems reported or observed.  Hallucinations: None  Delusions: No  Memory:Normal: Yes  Memory: Poor recent  Insight and Judgment: No  Insight and Judgment: Poor judgment    Tobacco Screening:  Practical Counseling, on admission, chikis X, if applicable and completed (first 3 are required if patient doesn't refuse):            ( ) Recognizing danger situations (included triggers and roadblocks)                    ( ) Coping skills (new ways to manage stress,relaxation techniques, changing routine, distraction)                                                           ( ) Basic information about quitting (benefits of quitting, techniques in how to quit,

## 2024-04-24 NOTE — GROUP NOTE
Group Therapy Note    Date: 4/24/2024    Group Start Time: 1100  Group End Time: 1145  Group Topic: Cognitive Skills    [facility] Ifrah Deras CTRS        Group Therapy Note    Attendees: 15/20     Patient's Goal: To increase social interaction, practice problem solving, and   and communication skills.       Notes:  Pt was pleasant and cooperative, and participated fully in group task. Pt was   able to practice problem solving, and communication skills independently.       Status After Intervention:  Improved     Participation Level: Active Listener and Interactive r/t  task and topic, supportive     Participation Quality: Appropriate, Attentive, engaged r/t task ,supportive     Speech:  Normal      Thought Process/Content: Logical, linear r/t task .       Affective Functioning: Congruent, brightened      Mood: Cooperative, euthymic       Level of consciousness:  Alert, and Attentive      Response to Learning: Able to verbalize current knowledge/experience, Able to   verbalize/acknowledge new learning, and Progressing to goal      Endings: None Reported      Modes of Intervention: Education, Support, Socialization, and Problem-solving    Discipline Responsible: Psychoeducational Specialist      Signature:  KELLY STEELE

## 2024-04-24 NOTE — PROGRESS NOTES
Behavioral Services  Medicare Certification Upon Admission    I certify that this patient's inpatient psychiatric hospital admission is medically necessary for:    [x] (1) Treatment which could reasonably be expected to improve this patient's condition,       [x] (2) Or for diagnostic study;     AND     [x](2) The inpatient psychiatric services are provided while the individual is under the care of a physician and are included in the individualized plan of care.    Estimated length of stay/service 2-9 days    Plan for post-hospital care -outpatient care    Electronically signed by THUY QUINTANA MD on 4/18/2024 at 1:19 PM      
  Daily Progress Note  4/19/2024    Patient Name: Wendy Monroy    CHIEF COMPLAINT:  Depression with suicidal ideation with plan and intent to overdose on Tylenol           SUBJECTIVE:      Patient is seen today for a follow up assessment while resting in bed, except the need for privacy.  On approach patient presents as guarded, flat and minimizing the reason for hospitalization.  He reports feeling down and depressed at times however minimizes suicide attempt.  Patient presents with poor insight in regards to severity of multiple suicide attempts and most recent by ingesting excess amount of Tylenol.  He reports to have had adequate oral intake and sleep since hospitalization.  Per nursing staff to have been out in milieu slightly more yesterday and social with select peers. Nursing staff reports the patient continues to be withdrawn, isolating in own room, lack of energy and motivation not engaging in group programming on the unit today.  Patient denies any issues with homicidal ideation, hallucinations or paranoia.  He reports to be tolerating Prozac well however has yet to notice if any improvement in symptoms.  Patient's BP to be hypotensive, most recent 94/67, heart rate 59, denies any dizziness, lightheadedness, chest pain or any other symptoms.  He is encouraged to increase fluid oral intake to help with hydration.  At this time patient has significant risk of self-harm thus not a candidate for low level care.  He requires further hospitalization for safety and stabilization    Patient did give verbal EMELY for writer to talk to mom, Nicole. Writer did speak to mom and mom voices that patient was states feeling fine however does due to autism in him.  Mom reports that she believes patient to be taking the suicide attempts somewhat seriously but unable to tell  just yet due to mom has yet to visit the patient. She was provided with patient's code per her request and states plan to visit patient today. Mom does 
  Daily Progress Note  4/20/2024    Patient Name: Wendy Monroy    CHIEF COMPLAINT:  Depression with suicidal ideation with plan and intent to overdose on Tylenol           SUBJECTIVE:      Patient is seen today for a follow up assessment while in sinus rhythm with, accepted need for privacy.  He has been out more in milieu, seclusive however has been engaging in group.  Patient has been compliant with scheduled Prozac.  It was noted that Prozac was recently increased and voices to notice slight improvement in mood. He reports to be tolerating medication well with no side effects.  He has not required any emergency medication last 24 hours.  On approach patient continues to present as guarded, flat and minimizing he reports feeling \"good\".  He continues to voice racing thoughts however states to \"naturally think a lot\".  Patient states \"somewhat\" hopeful about future.  When asked about recent suicide attempt patient reports \"I do not feel regret but I will definitely not do it again\" and becomes dismissive and not willing to elaborate further. Patient denies any issues with homicidal ideation, hallucinations or paranoia.  Writer attempted to ask patient how visit went with mom and reports to have went \"good\" however guarded and not forthcoming with any information.  He states adequate oral intake and sleep since hospitalization.  At this time patient has yet to demonstrate stability thus requires further hospitalization for safety and stabilization.    Vital signs and labs reviewed.  BP hypotensive, 104/63 however denies any symptoms, encourage fluid intake and continue to monitor for now.    Appetite:  [x] Adequate/Unchanged  [] Increased  [] Decreased      Sleep:       [x] Adequate/Unchanged  [] Fair  [] Poor      Group Attendance on Unit:   [] Yes   [x] Selectively    [] No    Compliant with scheduled medications: [x] Yes  [] No    Received emergency medications in past 24 hrs: [] Yes   [x] No    Medication Side 
  Daily Progress Note  4/21/2024    Patient Name: Wendy Monroy    CHIEF COMPLAINT:  Depression with suicidal ideation with plan and intent to overdose on Tylenol           SUBJECTIVE:      Patient is seen today for a follow up assessment while resting in bed, accepted need for privacy.  He has been compliant with scheduled Prozac and reports to be tolerating recent titrated dose well with no side effects.  Patient states to notice some improvement in depression.  He presents as flat, withdrawn and not very forthcoming with information on assessment.  He denies any issues with hallucinations or paranoia.  Patient states suicidal ideation to be improving.  Nursing staff reports that patient mostly isolated, out for meals only.  He has not engage in group programming on the unit today however did shower per nursing staff.  Although guarded and withdrawn patient linear and coherent on conversation.  He is free of delusional paranoid statements.  He reports \"Lynne\" to have come visit him yesterday and states to have went well.  Nursing staff reports the patient is somewhat discharged focused.  He informed writer plan of returning to own apartment and \"fight my eviction notice\".  He says his ultimate goal is \"having a good worklife balance\" however acknowledges not having a job currently.  He states to have \"quit 2 jobs\" when overdosed and noted to take care of mental health.  Patient reports adequate oral intake and sleep.  Although signs of improvement patient demonstrate stability thus requires further hospitalization for safety and stabilization.    Vital signs and labs reviewed.  BP hypotensive, 114/54 however denies any symptoms, encourage fluid intake and continue to monitor for now.    Appetite:  [x] Adequate/Unchanged  [] Increased  [] Decreased      Sleep:       [x] Adequate/Unchanged  [] Fair  [] Poor      Group Attendance on Unit:   [] Yes   [x] Selectively    [] No    Compliant with scheduled medications: [x] 
  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  Wendy was found in the milieu with other patients and agreed to meet with her privacy.  He is currently noting an improvement in suicidal ideation and denies any homicidal ideation.  He denies any paranoia, delusions, or perceptual disturbances.  He denies any concerns with sleep or appetite.  Overall he has remained pleasant cooperative with staff and other patients.  Patient does show poor insight to his previous attempts and his discharge focus.  He is unable to give reasoning for why he will not overdose again.  He states \"I just want.\"  He then states that he has \"removed anything that I could harm myself with.\"  We discussed that the patient overdosed on Tylenol and what would keep him from using that medication again.  He was unable to explain.  He is unable to endorse outpatient compliance with follow-up.  He appears to show no insight to this.  At this time the patient although noting an improvement in mood and suicidal thoughts, is still unsafe to leave the hospital.  He is unable to come up with an appropriate safety plan and shows no insight.  Medication management discharge planning per attending.  Vitals remained stable.        Appetite:  [x] Adequate/Unchanged  [] Increased  [] Decreased      Sleep:       [x] Adequate/Unchanged  [] Fair  [] Poor      Group Attendance on Unit:   [x] Yes   [] Selectively    [] No    Compliant with scheduled medications: [x] Yes  [] No    Received emergency medications in past 24 hrs: [] Yes   [x] No    Medication Side Effects: Denies         Mental Status Exam  Level of consciousness: Alert and awake   Appearance: Appropriate attire for setting, seated in chair, with fair  grooming and hygiene   Behavior/Motor: Approachable, engages with 
CLINICAL PHARMACY NOTE: MEDS TO BEDS    Total # of Prescriptions Filled: 1   The following medications were delivered to the patient:  Fluoxetine 20mg    Additional Documentation: delivered to [facility] 4/24/24 Pamela 2:30pm  -Rx sent 12:41pm pt needs voucher no insurance  
IN-PATIENT SERVICE  [facility]    CONSULTATION / HISTORY AND PHYSICAL EXAMINATION            Date:   4/21/2024  Patient name:  Wendy Monroy  Date of admission:  4/17/2024  8:24 PM  MRN:   [id number]  Account:  [id number]  YOB: 2005  PCP:    No primary care provider on file.  Room:   [address] [place] [address]  Code Status:    Full Code    Physician Requesting Consult: Mayank Henriquez MD    Reason for Consult:  medical management    Chief Complaint:     Chief Complaint   Patient presents with    Suicidal       History Obtained From:     Patient medical record nursing staff    History of Present Illness:   Patient, has past medical history major depression, admitted to [facility] unit with worsening depression, suicidal ideation  Patient is a very healthy young person never diagnosed with chronic medical condition like diabetes, hypertension, coronary artery disease  No complaints of chest pain, shortness of breath    Past Medical History:     History reviewed. No pertinent past medical history.     Past Surgical History:     History reviewed. No pertinent surgical history.     Medications Prior to Admission:     Prior to Admission medications    Not on File        Allergies:     Patient has no known allergies.    Social History:     Tobacco:    reports that he has never smoked. He has never been exposed to tobacco smoke. He has never used smokeless tobacco.  Alcohol:      has no history on file for alcohol use.  Drug Use:  reports no history of drug use.    Family History:     History reviewed. No pertinent family history.    Review of Systems:     Positive and Negative as described in HPI.    CONSTITUTIONAL:  negative for fevers, chills, sweats, fatigue, weight loss  HEENT:  negative for vision, hearing changes, runny nose, throat pain  RESPIRATORY:  negative for shortness of breath, cough, congestion, wheezing.  CARDIOVASCULAR:  negative for chest pain, 
IN-PATIENT SERVICE  [facility]    CONSULTATION / HISTORY AND PHYSICAL EXAMINATION            Date:   4/20/2024  Patient name:  Wendy Monroy  Date of admission:  4/17/2024  8:24 PM  MRN:   [id number]  Account:  [id number]  YOB: 2005  PCP:    No primary care provider on file.  Room:   [address] [place] [address]  Code Status:    Full Code    Physician Requesting Consult: Mayank Henriquez MD    Reason for Consult:  medical management    Chief Complaint:     Chief Complaint   Patient presents with    Suicidal       History Obtained From:     Patient medical record nursing staff    History of Present Illness:   Patient, has past medical history major depression, admitted to [facility] unit with worsening depression, suicidal ideation  Patient is a very healthy young person never diagnosed with chronic medical condition like diabetes, hypertension, coronary artery disease  No complaints of chest pain, shortness of breath    Past Medical History:     History reviewed. No pertinent past medical history.     Past Surgical History:     History reviewed. No pertinent surgical history.     Medications Prior to Admission:     Prior to Admission medications    Not on File        Allergies:     Patient has no known allergies.    Social History:     Tobacco:    reports that he has never smoked. He has never been exposed to tobacco smoke. He has never used smokeless tobacco.  Alcohol:      has no history on file for alcohol use.  Drug Use:  reports no history of drug use.    Family History:     History reviewed. No pertinent family history.    Review of Systems:     Positive and Negative as described in HPI.    CONSTITUTIONAL:  negative for fevers, chills, sweats, fatigue, weight loss  HEENT:  negative for vision, hearing changes, runny nose, throat pain  RESPIRATORY:  negative for shortness of breath, cough, congestion, wheezing.  CARDIOVASCULAR:  negative for chest pain, 
Pharmacy Medication History Note      List of current medications patient is taking is complete.    Source of information: [facility]; PDMP, Care Everywhere    Changes made to medication list:  Medications removed (include reason, ex. therapy complete or physician discontinued, noncompliance):  none    Medications flagged for provider review:  none    Medications added/doses adjusted:  none    Other notes (ex. Recent course of antibiotics, Coumadin dosing):  Patient is not on any home medications    Please let me know if you have any questions about this encounter. Thank you!    Electronically signed by Chana Krishna RPH on 4/18/2024 at 8:33 AM   
RT ASSESSMENT TREATMENT GOALS    [x]Pt Goal:  Pt will identify 1-2 positive coping skills by time of discharge.    []Pt Goal:  Pt will identify 1-2 positive aspects of self by time of discharge.    []Pt Goal:  Pt will remain on task/topic for 15-30 minutes during group by time of discharge.    []Pt Goal:  Pt will identify 1-2 aspects of relapse prevention plan by time of discharge.    [x]Pt Goal:  Pt will join in conversation with peers 1-2 times per group by time of discharge.    []Pt Goal:  Pt will identify 1-2 new leisure interests by time of discharge.    []Pt Goal:  Pt will not voice any delusional content by time of discharge.   
NEGATIVE  NEGATIVE Final    Comment:       (Positive cutoff 300 ng/mL)                  Opiates, Urine 04/17/2024 NEGATIVE  NEGATIVE Final    Comment:       (Positive cutoff 300 ng/mL)                  Phencyclidine, Urine 04/17/2024 NEGATIVE  NEGATIVE Final    Comment:       (Positive cutoff 25 ng/mL)                  Cannabinoid Scrn, Ur 04/17/2024 NEGATIVE  NEGATIVE Final    Comment:       (Positive cutoff 50 ng/mL)                  Oxycodone Screen, Ur 04/17/2024 NEGATIVE  NEGATIVE Final    Comment:       (Positive cutoff 100 ng/mL)                  Fentanyl, Ur 04/17/2024 NEGATIVE  NEGATIVE Final    Comment:       (Positive cutoff  5 ng/ml)            Test Information 04/17/2024 Assay provides medical screening only.  The absence of expected drug(s) and/or metabolite(s) may indicate diluted or adulterated urine, limitations of testing or timing of collection.   Final    Comment: Testing for legal purposes should be confirmed by another method.  To request confirmation   of test result, please call the lab within 7 days of sample submission.      TSH 04/17/2024 1.06  0.30 - 5.00 uIU/mL Final    Tricyclic Antidep,Urine 04/17/2024 NEGATIVE  NEGATIVE Final    Comment:       (Positive cutoff 1000 ng/mL)  Assay provides rapid clinical screening only.  Presumptive positive results for legal   purposes should be confirmed by another method.  To request confirmation, please call the   lab within 7 days of sample submission.           Reviewed patient's current plan of care and vital signs with nursing staff.    Labs reviewed: [x] Yes  No results found for: \"QTC\"    Medications  Current Facility-Administered Medications: FLUoxetine (PROZAC) capsule 20 mg, 20 mg, Oral, Daily  acetaminophen (TYLENOL) tablet 650 mg, 650 mg, Oral, Q4H PRN  aluminum & magnesium hydroxide-simethicone (MAALOX) 200-200-20 MG/5ML suspension 30 mL, 30 mL, Oral, Q6H PRN  hydrOXYzine HCl (ATARAX) tablet 50 mg, 50 mg, Oral, TID PRN  ibuprofen

## 2024-04-24 NOTE — DISCHARGE INSTRUCTIONS
Information:  Medications:   Medication summary provided   I understand that I should take only the medications on my list.     -why and when I need to take each medicine.     -which side effects to watch for.     -that I should carry my medication information at all times in case of     Emergency situations.    I will take all of my medicines to follow up appointments.     -check with my physician or pharmacist before taking any new    Medication, over the counter product or drink alcohol.    -Ask about food, drug or dietary supplement interactions.    -discard old lists and update records with medication providers.    Notify Physician:  Notify physician if you notice:   Always call 911 if you feel your life is in danger  In case of an emergency call 911 immediately!  If 911 is not available call your local emergency medical system for help    Behavioral Health Follow Up:  Original Referral Source:PPU  Discharge Diagnosis: Depression with suicidal ideation [F32.A, R45.851]  Recommendations for Level of Care: Follow up  Patient status at discharge: Stable  [facility] hospital  was[facility]  Aftercare plan faxed: [facility]   -faxed by:  [facility]   -date: 4/24/24   -time: 1500  Prescriptions: [facility]    Smoking: Quit Smoking.   Call the [employer]'s smoking quitline at 9-245-51S-QUIT  Know the signs of a heart attack   If you have any of the following symptoms call 911 immediately, do not wait more    Than five minutes.    1. Pressure, fullness and/ or squeezing in the center of the chest spreading to    The jaw, neck or shoulder.    2. Chest discomfort with light headedness, fainting, sweating, nausea or    Shortness of breath.   3. Upper abdominal pressure or discomfort.   4. Lower chest pain, back pain, unusual fatigue, shortness of breath, nausea   Or dizziness.     General Information:   Questions regarding your bill: Call HELP program [phone]     Suicide Hotline ([facility] Crisis Care Line)

## 2024-04-24 NOTE — GROUP NOTE
Group Therapy Note    Date: 4/24/2024    Group Start Time: 1340  Group End Time: 1420  Group Topic: Cognitive Skills    [facility]        Group Therapy Note    Attendees: 13/20     Patient's Goal: To increase social interaction, practice leisure skills and explore leisure resources   and outlets as coping skills using creative expression, tasks, and discussion.       Notes:  Pt was pleasant and cooperative, and participated fully in group task. Pt was   able to practice leisure skills and explore leisure resources and outlets as coping skills using   creative expression, tasks, and discussion. Pt was engaged in discussion with peers but in and out of group to work on discharge planning at times.       Status After Intervention:  Improved     Participation Level: Active Listener and Interactive r/t  task and topic, supportive     Participation Quality: Appropriate, Attentive, engaged r/t task ,supportive     Speech:  Normal      Thought Process/Content: Logical, linear r/t task .       Affective Functioning: Congruent, brightened      Mood: Cooperative, euthymic       Level of consciousness:  Alert, and Attentive      Response to Learning: Able to verbalize current knowledge/experience, Able to   verbalize/acknowledge new learning, and Progressing to goal      Endings: None Reported      Modes of Intervention: Education, Support, Socialization, and Problem-solving    Discipline Responsible: Psychoeducational Specialist      Signature:  KELLY STEELE

## 2024-04-24 NOTE — TRANSITION OF CARE
Behavioral Health Transition Record to Provider    Patient Name: Wendy Monroy  YOB: 2005   Medical Record Number: [id number]  Date of Admission: 4/17/2024  8:24 PM   Date of Discharge: 4/24/24    Attending Provider: Mayank Henriquez MD   Discharging Provider: Florence  To contact this individual call  and ask the  to page.  If unavailable, ask to be transferred to Behavioral Health Provider on call.  A Behavioral Health Provider will be available on call 24/7 and during holidays.    Primary Care Provider: No primary care provider on file.    No Known Allergies    Reason for Admission: , patient reporting depression and two suicide attempts by overdosing on Tylenol over the last 10 days. He states that he hasn't been happy for a long time and has not taken mental health medications in a long time. Complains of work stress, was working two jobs and quit both. Lives alone and feels he doesn't have friend/family support.     Admission Diagnosis: Depression with suicidal ideation [F32.A, R45.851]    * No surgery found *    Results for orders placed or performed during the hospital encounter of 04/17/24   TOX SCR, BLD, ED   Result Value Ref Range    Acetaminophen Level <5 (L) 10 - 30 ug/mL    Ethanol <10 <10 mg/dL    Ethanol percent <0.010 %    Salicylate Lvl <1.0 (L) 3 - 10 mg/dL   CBC   Result Value Ref Range    WBC 6.8 4.5 - 13.5 k/uL    RBC 4.88 4.5 - 5.9 m/uL    Hemoglobin 15.0 13.5 - 17.5 g/dL    Hematocrit 45.0 41 - 53 %    MCV 92.1 80 - 100 fL    MCH 30.8 26 - 34 pg    MCHC 33.4 31 - 37 g/dL    RDW 12.3 11.5 - 14.9 %    Platelets 247 150 - 450 k/uL    MPV 8.4 6.0 - 12.0 fL   Comprehensive Metabolic Panel   Result Value Ref Range    Sodium 138 135 - 144 mmol/L    Potassium 4.2 3.7 - 5.3 mmol/L    Chloride 98 98 - 107 mmol/L    CO2 28 20 - 31 mmol/L    Anion Gap 12 9 - 17 mmol/L    Glucose 97 70 - 99 mg/dL    BUN 11 6 - 20 mg/dL    Creatinine 0.9 0.7 - 1.2 mg/dL    Est, Glom Filt Rate >90 >60

## 2024-04-24 NOTE — PLAN OF CARE
Problem: Depression/Self Harm  Goal: Effect of psychiatric condition will be minimized and patient will be protected from self harm  Description: INTERVENTIONS:  1. Assess impact of patient's symptoms on level of functioning, self care needs and offer support as indicated  2. Assess patient/family knowledge of depression, impact on illness and need for teaching  3. Provide emotional support, presence and reassurance  4. Assess for possible suicidal thoughts or ideation. If patient expresses suicidal thoughts or statements do not leave alone, initiate Suicide Precautions, move to a room close to the nursing station and obtain sitter  5. Initiate consults as appropriate with Mental Health Professional, Spiritual Care, Psychosocial CNS, and consider a recommendation to the LIP for a Psychiatric Consultation  4/20/2024 2053 by Priyanka Ibrahim, RN  Outcome: Progressing     Problem: Coping  Goal: Pt/Family able to verbalize concerns and demonstrate effective coping strategies  Description: INTERVENTIONS:  1. Assist patient/family to identify coping skills, available support systems and cultural and spiritual values  2. Provide emotional support, including active listening and acknowledgement of concerns of patient and caregivers  3. Reduce environmental stimuli, as able  4. Instruct patient/family in relaxation techniques, as appropriate  5. Assess for spiritual pain/suffering and initiate Spiritual Care, Psychosocial Clinical Specialist consults as needed  4/20/2024 2053 by Priyanka Ibrahim, RN  Outcome: Progressing  Note: Patient is isolative to room sleeping most of shift. Patient reports improvement in mood. He denies suicidal ideation and thoughts of self harm. Patient denies need for any as needed medications. Staff maintains Q15 minute safety checks.     
  Problem: Depression/Self Harm  Goal: Effect of psychiatric condition will be minimized and patient will be protected from self harm  Description: INTERVENTIONS:  1. Assess impact of patient's symptoms on level of functioning, self care needs and offer support as indicated  2. Assess patient/family knowledge of depression, impact on illness and need for teaching  3. Provide emotional support, presence and reassurance  4. Assess for possible suicidal thoughts or ideation. If patient expresses suicidal thoughts or statements do not leave alone, initiate Suicide Precautions, move to a room close to the nursing station and obtain sitter  5. Initiate consults as appropriate with Mental Health Professional, Spiritual Care, Psychosocial CNS, and consider a recommendation to the LIP for a Psychiatric Consultation  4/21/2024 2159 by Tea Clifford, RN  Outcome: Progressing     Problem: Coping  Goal: Pt/Family able to verbalize concerns and demonstrate effective coping strategies  Description: INTERVENTIONS:  1. Assist patient/family to identify coping skills, available support systems and cultural and spiritual values  2. Provide emotional support, including active listening and acknowledgement of concerns of patient and caregivers  3. Reduce environmental stimuli, as able  4. Instruct patient/family in relaxation techniques, as appropriate  5. Assess for spiritual pain/suffering and initiate Spiritual Care, Psychosocial Clinical Specialist consults as needed  4/21/2024 2135 by Tea Clifford, RN  Outcome: Progressing  Flowsheets (Taken 4/21/2024 2135)  Patient/family able to verbalize anxieties, fears, and concerns, and demonstrate effective coping:   Provide emotional support, including active listening and acknowledgement of concerns of patient and caregivers   Assist patient/family to identify coping skills, available support systems and cultural and spiritual values     Problem: Depression/Self Harm  Goal: Effect 
  Problem: Risk for Elopement  Goal: Patient will not exit the unit/facility without proper excort  4/18/2024 2203 by Camacho Frank, RN  Outcome: Progressing  Patient displays no elopement behaviors.      Problem: Depression/Self Harm  Goal: Effect of psychiatric condition will be minimized and patient will be protected from self harm  Description: INTERVENTIONS:  1. Assess impact of patient's symptoms on level of functioning, self care needs and offer support as indicated  2. Assess patient/family knowledge of depression, impact on illness and need for teaching  3. Provide emotional support, presence and reassurance  4. Assess for possible suicidal thoughts or ideation. If patient expresses suicidal thoughts or statements do not leave alone, initiate Suicide Precautions, move to a room close to the nursing station and obtain sitter  5. Initiate consults as appropriate with Mental Health Professional, Spiritual Care, Psychosocial CNS, and consider a recommendation to the LIP for a Psychiatric Consultation  4/18/2024 2203 by Camacho Frank, RN  Outcome: Progressing     Problem: Coping  Goal: Pt/Family able to verbalize concerns and demonstrate effective coping strategies  Description: INTERVENTIONS:  1. Assist patient/family to identify coping skills, available support systems and cultural and spiritual values  2. Provide emotional support, including active listening and acknowledgement of concerns of patient and caregivers  3. Reduce environmental stimuli, as able  4. Instruct patient/family in relaxation techniques, as appropriate  5. Assess for spiritual pain/suffering and initiate Spiritual Care, Psychosocial Clinical Specialist consults as needed  4/18/2024 2203 by Camacho Frank, RN  Outcome: Progressing  Patient reports feeling better, but unable to identify what is better. He is unable to identify what he wants from this admission. He is social with select peers but seems awkward during assessment. Patient denies 
  Problem: Risk for Elopement  Goal: Patient will not exit the unit/facility without proper excort  4/20/2024 1143 by Lashaun Samuels LPN  Outcome: Progressing  4/19/2024 2148 by Stefanie Bolivar RN  Outcome: Progressing     Problem: Depression/Self Harm  Goal: Effect of psychiatric condition will be minimized and patient will be protected from self harm  Description: INTERVENTIONS:  1. Assess impact of patient's symptoms on level of functioning, self care needs and offer support as indicated  2. Assess patient/family knowledge of depression, impact on illness and need for teaching  3. Provide emotional support, presence and reassurance  4. Assess for possible suicidal thoughts or ideation. If patient expresses suicidal thoughts or statements do not leave alone, initiate Suicide Precautions, move to a room close to the nursing station and obtain sitter  5. Initiate consults as appropriate with Mental Health Professional, Spiritual Care, Psychosocial CNS, and consider a recommendation to the LIP for a Psychiatric Consultation  4/20/2024 1143 by Lashaun Samuels LPN  Outcome: Progressing  4/19/2024 2148 by Stefanie Bolivar RN  Outcome: Progressing     Problem: Coping  Goal: Pt/Family able to verbalize concerns and demonstrate effective coping strategies  Description: INTERVENTIONS:  1. Assist patient/family to identify coping skills, available support systems and cultural and spiritual values  2. Provide emotional support, including active listening and acknowledgement of concerns of patient and caregivers  3. Reduce environmental stimuli, as able  4. Instruct patient/family in relaxation techniques, as appropriate  5. Assess for spiritual pain/suffering and initiate Spiritual Care, Psychosocial Clinical Specialist consults as needed  4/20/2024 1143 by Lashaun Samuels LPN  Outcome: Progressing  4/19/2024 2148 by Stefanie Bolivar RN  Outcome: Progressing     Patient is guarded and shakes head no to every assessment question 
  Problem: Risk for Elopement  Goal: Patient will not exit the unit/facility without proper excort  4/22/2024 2147 by Cheikh Naranjo RN  Outcome: Progressing     Problem: Coping  Goal: Pt/Family able to verbalize concerns and demonstrate effective coping strategies  Description: INTERVENTIONS:  1. Assist patient/family to identify coping skills, available support systems and cultural and spiritual values  2. Provide emotional support, including active listening and acknowledgement of concerns of patient and caregivers  3. Reduce environmental stimuli, as able  4. Instruct patient/family in relaxation techniques, as appropriate  5. Assess for spiritual pain/suffering and initiate Spiritual Care, Psychosocial Clinical Specialist consults as needed  4/22/2024 2147 by Cheikh Naranjo RN  Outcome: Progressing     Problem: Depression/Self Harm  Goal: Effect of psychiatric condition will be minimized and patient will be protected from self harm  Description: INTERVENTIONS:  1. Assess impact of patient's symptoms on level of functioning, self care needs and offer support as indicated  2. Assess patient/family knowledge of depression, impact on illness and need for teaching  3. Provide emotional support, presence and reassurance  4. Assess for possible suicidal thoughts or ideation. If patient expresses suicidal thoughts or statements do not leave alone, initiate Suicide Precautions, move to a room close to the nursing station and obtain sitter  5. Initiate consults as appropriate with Mental Health Professional, Spiritual Care, Psychosocial CNS, and consider a recommendation to the LIP for a Psychiatric Consultation  4/22/2024 2147 by Cheikh Naranjo RN  Outcome: Progressing  Note: Patient denies thoughts to harm self and others. Patient was cooperative with assessment, patient reports his mood is \"good\", however, is \"worried\" about his future. Patient did not have any scheduled medications and remains in control of behavior at 
  Problem: Risk for Elopement  Goal: Patient will not exit the unit/facility without proper excort  Outcome: Progressing     Problem: Depression/Self Harm  Goal: Effect of psychiatric condition will be minimized and patient will be protected from self harm  Description: INTERVENTIONS:  1. Assess impact of patient's symptoms on level of functioning, self care needs and offer support as indicated  2. Assess patient/family knowledge of depression, impact on illness and need for teaching  3. Provide emotional support, presence and reassurance  4. Assess for possible suicidal thoughts or ideation. If patient expresses suicidal thoughts or statements do not leave alone, initiate Suicide Precautions, move to a room close to the nursing station and obtain sitter  5. Initiate consults as appropriate with Mental Health Professional, Spiritual Care, Psychosocial CNS, and consider a recommendation to the LIP for a Psychiatric Consultation  Outcome: Progressing     Problem: Coping  Goal: Pt/Family able to verbalize concerns and demonstrate effective coping strategies  Description: INTERVENTIONS:  1. Assist patient/family to identify coping skills, available support systems and cultural and spiritual values  2. Provide emotional support, including active listening and acknowledgement of concerns of patient and caregivers  3. Reduce environmental stimuli, as able  4. Instruct patient/family in relaxation techniques, as appropriate  5. Assess for spiritual pain/suffering and initiate Spiritual Care, Psychosocial Clinical Specialist consults as needed  Outcome: Progressing       Patient denies thoughts of self harm during this shift. Patient denies having depression or anxiety. Patient is minimally interactive during shift assessment. Patient has a flat affect during assessment and does not make eye contact. Patient remains mostly isolative to room during this shift and is out for meals only. Q15 minute and random safety checks 
  Problem: Risk for Elopement  Goal: Patient will not exit the unit/facility without proper excort  Outcome: Progressing   Patient denies experiencing feelings of anxiety and depression. Patient denies experiencing thoughts of wanting to harm himself or others. Patient denies experiencing any auditory hallucinations in over a day. Patient relates that he is sleeping and eating well while present here. Patient has been out in dayroom but remains aloof to his peers, and is often secluded to his room. Patient has yet to attend group therapy sessions today, but staff will continue to encourage participation in unit programming. Patient has been polite and cooperative with staff. Patient agrees to notify staff if any thoughts, feelings, or concerns need to be brought to their attention. Q15 minute safety checks maintained.        Problem: Depression/Self Harm  Goal: Effect of psychiatric condition will be minimized and patient will be protected from self harm  Description: INTERVENTIONS:  1. Assess impact of patient's symptoms on level of functioning, self care needs and offer support as indicated  2. Assess patient/family knowledge of depression, impact on illness and need for teaching  3. Provide emotional support, presence and reassurance  4. Assess for possible suicidal thoughts or ideation. If patient expresses suicidal thoughts or statements do not leave alone, initiate Suicide Precautions, move to a room close to the nursing station and obtain sitter  5. Initiate consults as appropriate with Mental Health Professional, Spiritual Care, Psychosocial CNS, and consider a recommendation to the LIP for a Psychiatric Consultation  Outcome: Progressing     Problem: Coping  Goal: Pt/Family able to verbalize concerns and demonstrate effective coping strategies  Description: INTERVENTIONS:  1. Assist patient/family to identify coping skills, available support systems and cultural and spiritual values  2. Provide emotional 
  Problem: Risk for Elopement  Goal: Patient will not exit the unit/facility without proper excort  Outcome: Progressing  Flowsheets (Taken 4/23/2024 1508)  Nursing Interventions for Elopement Risk:   Make sure patient has all necessary personal care items   Reduce environmental triggers  Note: No exit seeking behaviors noted this shift. Patient dnies thoughts to elope.      Problem: Depression/Self Harm  Goal: Effect of psychiatric condition will be minimized and patient will be protected from self harm  Description: INTERVENTIONS:  1. Assess impact of patient's symptoms on level of functioning, self care needs and offer support as indicated  2. Assess patient/family knowledge of depression, impact on illness and need for teaching  3. Provide emotional support, presence and reassurance  4. Assess for possible suicidal thoughts or ideation. If patient expresses suicidal thoughts or statements do not leave alone, initiate Suicide Precautions, move to a room close to the nursing station and obtain sitter  5. Initiate consults as appropriate with Mental Health Professional, Spiritual Care, Psychosocial CNS, and consider a recommendation to the LIP for a Psychiatric Consultation  Outcome: Progressing  Flowsheets (Taken 4/23/2024 1508)  Effect of psychiatric condition will be minimized and patient will be protected from self harm:   Assess impact of patient’s symptoms on level of functioning, self care needs and offer support as indicated   Assess patient/family knowledge of depression, impact on illness and need for teaching   Provide emotional support, presence and reassurance   Assess for suicidal thoughts or ideation. If patient expresses suicidal thoughts or statements do not leave alone, initiate Suicide Precautions, move near nurse station, obtain sitter  Note: Patient denies thoughts of self harm and is agreeable to seeking out should thoughts of self harm arise.  Safe environment maintained.  Q15 minute checks 
[facility]  Day 3 Interdisciplinary Treatment Plan NOTE    Review Date & Time: 4/20/2024   1245    Admission Type:   Admission Type: Involuntary    Reason for admission:  Reason for Admission: Involuntary patient reporting depression and two suicide attempts by overdosing on Tylenol over the last 10 days. He states that he hasn't been happy for a long time and has not taken mental health medications in a long time.  Estimated Length of Stay: 5-7 days  Estimated Discharge Date Update: to be determined by physician    PATIENT STRENGTHS:  Patient Strengths    Patient Strengths and Limitations:Limitations: Difficult relationships / poor social skills, Difficulty problem solving/relies on others to help solve problems, Apathetic / unmotivated  Addictive Behavior:Addictive Behavior  In the Past 3 Months, Have You Felt or Has Someone Told You That You Have a Problem With  : None  Medical Problems:History reviewed. No pertinent past medical history.    Risk:  Fall Risk   Ortiz Scale Ortiz Scale Score: 22  BVC    Change in scores no Changes to plan of Care no    Status EXAM:   Mental Status and Behavioral Exam  Normal: No  Level of Assistance: Independent/Self  Facial Expression: Avoids Gaze, Flat  Affect: Blunt  Level of Consciousness: Alert  Frequency of Checks: 4 times per hour, close  Mood:Normal: No  Mood: Anxious, Depressed, Sad  Motor Activity:Normal: Yes  Eye Contact: Poor  Observed Behavior: Cooperative, Preoccupied, Guarded  Sexual Misconduct History: Current - no  Preception: [place] to time, [place] to person, [place] to place, [place] to situation  Attention:Normal: No  Attention: Distractible  Thought Processes: Circumstantial  Thought Content:Normal: No  Thought Content: Preoccupations  Depression Symptoms: Feelings of helplessness, Feelings of hopelessess  Anxiety Symptoms: No problems reported or observed.  Kristine Symptoms: No problems reported or observed.  Hallucinations: None  Delusions: 
[facility]  Initial Interdisciplinary Treatment Plan NO      Original treatment plan Date & Time: 4/18/2024   12:08 pm    Admission Type:  Admission Type: Involuntary    Reason for admission:   Reason for Admission: Involuntary patient reporting depression and two suicide attempts by overdosing on Tylenol over the last 10 days. He states that he hasn't been happy for a long time and has not taken mental health medications in a long time.    Estimated Length of Stay:  5-7days  Estimated Discharge Date: to be determined by physician    PATIENT STRENGTHS:  Patient Strengths:   Patient Strengths and Limitations:Limitations: Difficult relationships / poor social skills, Difficulty problem solving/relies on others to help solve problems, Apathetic / unmotivated  Addictive Behavior: Addictive Behavior  In the Past 3 Months, Have You Felt or Has Someone Told You That You Have a Problem With  : None  Medical Problems:History reviewed. No pertinent past medical history.  Status EXAM:Mental Status and Behavioral Exam  Normal: No  Level of Assistance: Independent/Self  Facial Expression: Avoids Gaze, Flat, Sad  Affect: Blunt  Level of Consciousness: Alert  Frequency of Checks: 4 times per hour, close  Mood:Normal: No  Mood: Depressed, Anxious, Helpless  Motor Activity:Normal: Yes  Eye Contact: Fair  Observed Behavior: Preoccupied  Sexual Misconduct History: Current - no  Preception: [place] to person, [place] to time, C[place] to place, [place] to situation  Attention:Normal: No  Attention: Distractible  Thought Processes: Circumstantial  Thought Content:Normal: No  Thought Content: Preoccupations  Depression Symptoms: Change in energy level, Loss of interest, Isolative, Impaired concentration, Feelings of helplessness, Feelings of hopelessess, Feelings of worthlessness, Sleep disturbance  Anxiety Symptoms: Generalized  Kristine Symptoms: No problems reported or observed.  Hallucinations: None  Delusions: 
Problem: Depression/Self Harm  Goal: Effect of psychiatric condition will be minimized and patient will be protected from self harm  Description: INTERVENTIONS:  1. Assess impact of patient's symptoms on level of functioning, self care needs and offer support as indicated  2. Assess patient/family knowledge of depression, impact on illness and need for teaching  3. Provide emotional support, presence and reassurance  4. Assess for possible suicidal thoughts or ideation. If patient expresses suicidal thoughts or statements do not leave alone, initiate Suicide Precautions, move to a room close to the nursing station and obtain sitter  5. Initiate consults as appropriate with Mental Health Professional, Spiritual Care, Psychosocial CNS, and consider a recommendation to the LIP for a Psychiatric Consultation  4/19/2024 2148 by Stefanie Bolivar RN  Outcome: Progressing    Pt denies thoughts of self-harm, safety checks maintained Q15 minutes. Reports improving depression. Pt is calm and sits quietly in day area with coloring sheets and games. Withdrawn and guarded, pt did not engage much with writer. No medications. No complaints about sleep or appetite. Hygiene encouraged.      
Patient denies need for any as needed medications. Staff maintains Q15 minute safety checks.     Patient is guarded, withdrawn and answers all questions in an delayed response               no to all questions being asked . Patient is isolative to room out for meals only . Patient is noted to be disheveled , writer encourages shower and patient agrees to after lunch. Patient is flat , aloof , guarded, withdrawn medication compliant, not interactive,  pleasant, and cooperative.  
lack of support, and now worries because he quit his jobs and needs to pay his rent. Took medications is seen in the dayroom, watching TV.   
socializing with peers. No exit seeking behaviors noted. Safe environment maintained, will continue to offer support.

## 2024-04-24 NOTE — GROUP NOTE
Group Therapy Note    Date: 4/24/2024    Group Start Time: 0900  Group End Time: 0915  Group Topic: Community Meeting    Sara Siu        Group Therapy Note    Attendees: 10/21       Patient's Goal:  make phone calls     Status After Intervention:  Unchanged    Participation Level: Active Listener and Interactive    Participation Quality: Appropriate and Attentive      Speech:  normal      Thought Process/Content: Logical      Affective Functioning: Congruent      Mood: euthymic      Level of consciousness:  Alert and Oriented x4      Response to Learning: Able to verbalize current knowledge/experience and Able to verbalize/acknowledge new learning      Endings: None Reported    Modes of Intervention: Education and Support      Discipline Responsible: Behavorial Health Tech      Signature:  Sara Ramsey

## 2024-07-31 ENCOUNTER — OFFICE VISIT (OUTPATIENT)
Dept: ORTHOPEDIC SURGERY | Age: 19
End: 2024-07-31
Payer: MEDICAID

## 2024-07-31 VITALS — WEIGHT: 120 LBS | HEIGHT: 67 IN | BODY MASS INDEX: 18.83 KG/M2

## 2024-07-31 DIAGNOSIS — S62.014A CLOSED NONDISPLACED FRACTURE OF DISTAL POLE OF SCAPHOID BONE OF RIGHT WRIST, INITIAL ENCOUNTER: Primary | ICD-10-CM

## 2024-07-31 DIAGNOSIS — M79.641 PAIN OF RIGHT HAND: ICD-10-CM

## 2024-07-31 PROCEDURE — 99203 OFFICE O/P NEW LOW 30 MIN: CPT | Performed by: STUDENT IN AN ORGANIZED HEALTH CARE EDUCATION/TRAINING PROGRAM
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
## 2024-08-28 ENCOUNTER — OFFICE VISIT (OUTPATIENT)
Dept: ORTHOPEDIC SURGERY | Age: 19
End: 2024-08-28
Payer: MEDICAID

## 2024-08-28 VITALS — BODY MASS INDEX: 18.79 KG/M2 | HEIGHT: 67 IN

## 2024-08-28 DIAGNOSIS — M79.641 PAIN OF RIGHT HAND: Primary | ICD-10-CM

## 2024-08-28 PROCEDURE — 99213 OFFICE O/P EST LOW 20 MIN: CPT | Performed by: ORTHOPAEDIC SURGERY

## 2024-08-28 NOTE — PROGRESS NOTES
scaphoid fracture    Comparison: Right wrist films taken on 7/31/2024    Findings:   3 views of the right wrist (AP, LAT, oblique) demonstrating a distal pole transverse fracture of the scaphoid.  Alignment is unchanged from prior films.    Impression: Right scaphoid fracture unchanged from prior films with overlying cast    Assessment:   19-year-old male being seen for:    - Right scaphoid fracture    Plan:   -Conversation was held at bedside with patient regarding the right scaphoid fracture.  At this time he is doing very well.  His pain is well-controlled.  We went over imaging of the right wrist which demonstrates no change.  -Will continue the cast for the next 3 weeks.  When he follows regarding this fracture we will change her to a wrist brace at the next visit  - Pain arises take Tylenol as needed  - Continue to ice and elevate for pain and edema  - Ice and elevate for pain and edema  - Patient will follow-up in 3 weeks for repeat imaging of his right wrist.  At this time if imaging is unchanged we will switch to a wrist brace.       Orders Placed This Encounter   Procedures    XR HAND RIGHT (MIN 3 VIEWS)     Standing Status:   Future     Number of Occurrences:   1     Standing Expiration Date:   8/20/2025     Chinedu Parra DO  Orthopedic Surgery Resident, PGY-2  [place], [place]

## 2024-09-25 ENCOUNTER — OFFICE VISIT (OUTPATIENT)
Dept: ORTHOPEDIC SURGERY | Age: 19
End: 2024-09-25

## 2024-09-25 VITALS — BODY MASS INDEX: 18.83 KG/M2 | WEIGHT: 120 LBS | HEIGHT: 67 IN

## 2024-09-25 DIAGNOSIS — S62.001D CLOSED NONDISPLACED FRACTURE OF SCAPHOID OF RIGHT WRIST WITH ROUTINE HEALING, UNSPECIFIED PORTION OF SCAPHOID, SUBSEQUENT ENCOUNTER: ICD-10-CM

## 2024-09-25 DIAGNOSIS — M25.531 RIGHT WRIST PAIN: Primary | ICD-10-CM

## 2024-09-25 NOTE — PROGRESS NOTES
[facility]  [address]  [place] [address]  Dept: [phone]  Dept Fax: [phone]        Orthopaedic Clinic Follow Up      Subjective:       Wendy Monroy is a 19 y.o. year old male who presents to the clinic today for routine follow up for his nonoperative treatment of his right distal pole scaphoid fracture as the patient sustained roughly 9 weeks ago, and comes in today for x-rays out of the cast, as well as evaluation of his range of motion and pain.  Patient denies any interval change in history.  He states that his pain has fully subsided.  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  He was last seen in our clinic on 8/28/2024, and since then he has been doing very well.  He states that he had no issues with his cast. At today's visit he denies fever, chills, CP, N/V, SOB.      Review of Systems  Gen: no fever, chills, malaise  CV: no chest pain or palpitations  Resp: no cough or shortness of breath  GI: no nausea, vomiting, diarrhea, or constipation  Neuro: no seizures, vertigo, or headache  Msk: As per HPI  10 remaining systems reviewed and negative    Objective :   There were no vitals filed for this visit.Body mass index is 18.79 kg/m².  General: No acute distress, resting comfortably in the clinic  Neuro: alert. oriented  Eyes: Extra-ocular muscles intact  Pulm: Respirations unlabored and regular.  Skin: warm, well perfused  Psych:   Patient has good fund of knowledge and displays understanding of exam, diagnosis, and plan.  MSK: RUE: Skin intact. No ecchymoses, abrasion, deformity, or lacerations.  No tenderness palpation about the snuffbox over the scaphoid tubercle.  No pain with scaphoid stress.  No bony crepitus. Compartments soft and easily compressible.  Range of motion of the wrist: 30 degrees flexion, 5 degrees extension, equivocal supination pronation comparison to contralateral extremity.

## 2024-10-16 ENCOUNTER — OFFICE VISIT (OUTPATIENT)
Dept: ORTHOPEDIC SURGERY | Age: 19
End: 2024-10-16
Payer: MEDICAID

## 2024-10-16 VITALS — HEIGHT: 67 IN | WEIGHT: 120 LBS | BODY MASS INDEX: 18.83 KG/M2

## 2024-10-16 DIAGNOSIS — S62.014A CLOSED NONDISPLACED FRACTURE OF DISTAL POLE OF SCAPHOID BONE OF RIGHT WRIST, INITIAL ENCOUNTER: ICD-10-CM

## 2024-10-16 DIAGNOSIS — M25.531 RIGHT WRIST PAIN: ICD-10-CM

## 2024-10-16 DIAGNOSIS — M25.532 WRIST PAIN, LEFT: Primary | ICD-10-CM

## 2024-10-16 PROCEDURE — 99213 OFFICE O/P EST LOW 20 MIN: CPT | Performed by: ORTHOPAEDIC SURGERY

## 2024-10-16 NOTE — PROGRESS NOTES
[facility]  [address]  [place] [address]  Dept: [phone]  Dept Fax: [phone]        Ambulatory Follow Up    Subjective:       HPI:    Wendy Monroy is a 19 y.o. year old male who presents to our office today for routine follow-up regarding his right distal pole of scaphoid fracture, treated nonoperatively.  Patient was most recently seen in clinic on 9/25/2024, and at that time was felt to still be healing and was given a removable wrist brace to use, as well as instructed to work with occupational therapy on range of motion and  strength of the wrist.  Today, the patient has no complaints.  He has been wearing his wrist brace as directed, as well as attending occupational therapy.  He would like to return to work without any restrictions.  Denies any numbness or tingling, as well as any new injuries.      Review of Systems:  Constitutional: Negative for fever and chills.   HENT: Negative for congestion.    Eyes: Negative for blurred vision and double vision.   Respiratory: Negative for cough, shortness of breath and wheezing.    Cardiovascular: Negative for chest pain and palpitations.   Gastrointestinal: Negative for nausea. Negative for vomiting.   Musculoskeletal: Per HPI  Skin: Negative for itching and rash.   Neurological: Negative for dizziness, sensory change and headaches.   Psychiatric/Behavioral: Negative for depression and suicidal ideas.       Objective :   General: AAOx3, NAD, appears stated age  CV: no obvious JVD, distal pulses 2+  Respiratory: chest rise symmetric, unlabored respirations, no audible wheezing  Skin: warm, well perfused, no obvious rashes or lesions  Psych: Patient displays understanding of exam, diagnosis, and plan.    MSK:     RUE: Skin intact.  Full range of motion of the wrist and digits without pain.  Nontender to palpation over the scaphoid.  Good  strength compared to the